# Patient Record
Sex: MALE | Race: WHITE | Employment: FULL TIME | URBAN - NONMETROPOLITAN AREA
[De-identification: names, ages, dates, MRNs, and addresses within clinical notes are randomized per-mention and may not be internally consistent; named-entity substitution may affect disease eponyms.]

---

## 2017-03-09 ENCOUNTER — OFFICE VISIT (OUTPATIENT)
Dept: FAMILY MEDICINE CLINIC | Age: 62
End: 2017-03-09

## 2017-03-09 VITALS
OXYGEN SATURATION: 98 % | SYSTOLIC BLOOD PRESSURE: 134 MMHG | HEART RATE: 80 BPM | BODY MASS INDEX: 41.58 KG/M2 | WEIGHT: 297 LBS | HEIGHT: 71 IN | DIASTOLIC BLOOD PRESSURE: 78 MMHG

## 2017-03-09 DIAGNOSIS — E78.5 HYPERLIPIDEMIA, UNSPECIFIED HYPERLIPIDEMIA TYPE: ICD-10-CM

## 2017-03-09 DIAGNOSIS — Z12.5 SCREENING PSA (PROSTATE SPECIFIC ANTIGEN): Primary | ICD-10-CM

## 2017-03-09 DIAGNOSIS — I10 ESSENTIAL HYPERTENSION: ICD-10-CM

## 2017-03-09 DIAGNOSIS — L30.9 DERMATITIS: ICD-10-CM

## 2017-03-09 PROCEDURE — 99213 OFFICE O/P EST LOW 20 MIN: CPT | Performed by: FAMILY MEDICINE

## 2017-03-09 RX ORDER — LOSARTAN POTASSIUM AND HYDROCHLOROTHIAZIDE 12.5; 5 MG/1; MG/1
1 TABLET ORAL DAILY
Qty: 90 TABLET | Refills: 3 | Status: SHIPPED | OUTPATIENT
Start: 2017-03-09 | End: 2018-04-24 | Stop reason: SDUPTHER

## 2017-03-09 RX ORDER — CLOTRIMAZOLE AND BETAMETHASONE DIPROPIONATE 10; .64 MG/G; MG/G
CREAM TOPICAL
Qty: 45 G | Refills: 2 | Status: SHIPPED | OUTPATIENT
Start: 2017-03-09 | End: 2018-01-30 | Stop reason: SDUPTHER

## 2017-04-10 LAB
ALBUMIN SERPL-MCNC: 4.4 G/DL
ALP BLD-CCNC: 68 U/L
ALT SERPL-CCNC: 15 U/L
AST SERPL-CCNC: 16 U/L
BASOPHILS ABSOLUTE: 0 /ΜL
BASOPHILS RELATIVE PERCENT: 0 %
BILIRUB SERPL-MCNC: 0.5 MG/DL (ref 0.1–1.4)
BUN BLDV-MCNC: 25 MG/DL
CALCIUM SERPL-MCNC: 8.9 MG/DL
CHLORIDE BLD-SCNC: 103 MMOL/L
CHOLESTEROL, TOTAL: 201 MG/DL
CHOLESTEROL/HDL RATIO: NORMAL
CO2: 23 MMOL/L
CREAT SERPL-MCNC: 1.57 MG/DL
EOSINOPHILS ABSOLUTE: 0.4 /ΜL
EOSINOPHILS RELATIVE PERCENT: 5 %
GFR CALCULATED: 47
GLUCOSE BLD-MCNC: 113 MG/DL
HCT VFR BLD CALC: 46.7 % (ref 41–53)
HDLC SERPL-MCNC: 46 MG/DL (ref 35–70)
HEMOGLOBIN: 15.3 G/DL (ref 13.5–17.5)
LDL CHOLESTEROL CALCULATED: 127 MG/DL (ref 0–160)
LYMPHOCYTES ABSOLUTE: 2.2 /ΜL
LYMPHOCYTES RELATIVE PERCENT: 28 %
MCH RBC QN AUTO: 32.3 PG
MCHC RBC AUTO-ENTMCNC: 13.7 G/DL
MCV RBC AUTO: 99 FL
MONOCYTES ABSOLUTE: 0.2 /ΜL
MONOCYTES RELATIVE PERCENT: 6 %
NEUTROPHILS ABSOLUTE: 4.7 /ΜL
NEUTROPHILS RELATIVE PERCENT: 61 %
PLATELET # BLD: 231 K/ΜL
PMV BLD AUTO: NORMAL FL
POTASSIUM SERPL-SCNC: 4.6 MMOL/L
PROSTATE SPECIFIC ANTIGEN: 1.2 NG/ML
RBC # BLD: 4.73 10^6/ΜL
SODIUM BLD-SCNC: 143 MMOL/L
TOTAL PROTEIN: 6.4
TRIGL SERPL-MCNC: 140 MG/DL
VLDLC SERPL CALC-MCNC: 28 MG/DL
WBC # BLD: 7.8 10^3/ML

## 2017-04-11 DIAGNOSIS — Z12.5 SCREENING PSA (PROSTATE SPECIFIC ANTIGEN): ICD-10-CM

## 2017-04-11 DIAGNOSIS — I10 ESSENTIAL HYPERTENSION: ICD-10-CM

## 2017-09-29 ENCOUNTER — OFFICE VISIT (OUTPATIENT)
Dept: FAMILY MEDICINE CLINIC | Age: 62
End: 2017-09-29

## 2017-09-29 VITALS
BODY MASS INDEX: 38.64 KG/M2 | OXYGEN SATURATION: 98 % | HEIGHT: 71 IN | SYSTOLIC BLOOD PRESSURE: 150 MMHG | TEMPERATURE: 97.4 F | WEIGHT: 276 LBS | DIASTOLIC BLOOD PRESSURE: 82 MMHG | HEART RATE: 68 BPM

## 2017-09-29 DIAGNOSIS — L03.115 CELLULITIS OF RIGHT LOWER EXTREMITY: ICD-10-CM

## 2017-09-29 DIAGNOSIS — R10.9 ABDOMINAL CRAMPING: Primary | ICD-10-CM

## 2017-09-29 PROCEDURE — 99213 OFFICE O/P EST LOW 20 MIN: CPT | Performed by: NURSE PRACTITIONER

## 2017-09-29 RX ORDER — DICYCLOMINE HYDROCHLORIDE 10 MG/1
10 CAPSULE ORAL 4 TIMES DAILY
Qty: 30 CAPSULE | Refills: 0 | Status: SHIPPED | OUTPATIENT
Start: 2017-09-29 | End: 2017-10-05 | Stop reason: ALTCHOICE

## 2017-09-29 RX ORDER — DOXYCYCLINE HYCLATE 100 MG
100 TABLET ORAL 2 TIMES DAILY
Qty: 20 TABLET | Refills: 0 | Status: SHIPPED | OUTPATIENT
Start: 2017-09-29 | End: 2017-10-05

## 2017-09-29 ASSESSMENT — ENCOUNTER SYMPTOMS
VOMITING: 0
FLATUS: 1
CRAMPS: 1
HEMATOCHEZIA: 0
DIARRHEA: 1
SORE THROAT: 0

## 2017-09-29 ASSESSMENT — CROHNS DISEASE ACTIVITY INDEX (CDAI): CDAI SCORE: 0

## 2017-10-05 ENCOUNTER — OFFICE VISIT (OUTPATIENT)
Dept: FAMILY MEDICINE CLINIC | Age: 62
End: 2017-10-05

## 2017-10-05 VITALS
WEIGHT: 281 LBS | OXYGEN SATURATION: 98 % | BODY MASS INDEX: 39.34 KG/M2 | HEIGHT: 71 IN | HEART RATE: 79 BPM | DIASTOLIC BLOOD PRESSURE: 70 MMHG | SYSTOLIC BLOOD PRESSURE: 130 MMHG

## 2017-10-05 DIAGNOSIS — L03.115 CELLULITIS OF RIGHT LOWER EXTREMITY: Primary | ICD-10-CM

## 2017-10-05 PROCEDURE — 99213 OFFICE O/P EST LOW 20 MIN: CPT | Performed by: FAMILY MEDICINE

## 2017-10-05 RX ORDER — MUPIROCIN CALCIUM 20 MG/G
CREAM TOPICAL
Qty: 30 G | Refills: 1 | Status: SHIPPED | OUTPATIENT
Start: 2017-10-05 | End: 2018-02-05 | Stop reason: SDUPTHER

## 2017-10-05 RX ORDER — CLINDAMYCIN HYDROCHLORIDE 300 MG/1
300 CAPSULE ORAL 3 TIMES DAILY
Qty: 30 CAPSULE | Refills: 0 | Status: SHIPPED | OUTPATIENT
Start: 2017-10-05 | End: 2017-10-15

## 2017-10-05 ASSESSMENT — PATIENT HEALTH QUESTIONNAIRE - PHQ9
2. FEELING DOWN, DEPRESSED OR HOPELESS: 0
1. LITTLE INTEREST OR PLEASURE IN DOING THINGS: 0
SUM OF ALL RESPONSES TO PHQ QUESTIONS 1-9: 0
SUM OF ALL RESPONSES TO PHQ9 QUESTIONS 1 & 2: 0

## 2017-10-05 NOTE — PROGRESS NOTES
Chief Complaint   Patient presents with    Follow-up     1 week f/u on on right leg. pt states it is still swollen and hurting. pt states he does not know what else to do. pt states his right knee is now hurting also. HPI:  Reza Wilks is a 57 y.o. male    1 week follow up  Right leg pain, swelling  Prescribed doxy  Not working  Hx MRSA cellulitis  Had to take clinda in past     Left knee has been acting up with arthritis    Has been a month without smoking    Wt Readings from Last 3 Encounters:   10/05/17 281 lb (127.5 kg)   09/29/17 276 lb (125.2 kg)   03/09/17 297 lb (134.7 kg)         Patient Active Problem List   Diagnosis    Hypertension    Tobacco abuse    Obesity    Hyperlipidemia    COPD [facility] obstructive [facility]) [facility] [address]    Cellulitis           Past Medical History:   Diagnosis Date    Cellulitis     COPD (chronic obstructive pulmonary disease) ([facility] [address]     Hyperlipidemia     Hypertension     Obesity     Pneumonia 2011    Retinal detachment     Tobacco abuse      Past Surgical History:   Procedure Laterality Date    EYE SURGERY  2016     Family History   Problem Relation Age of Onset    Heart Disease Mother     Cancer Father      LUNG    High Blood Pressure Father      Social History     Social History    Marital status:      Spouse name: N/A    Number of children: N/A    Years of education: N/A     Social History Main Topics    Smoking status: Current Every Day Smoker     Years: 40.00    Smokeless tobacco: Never Used    Alcohol use Yes      Comment: rare    Drug use: No    Sexual activity: Yes     Partners: Female     Other Topics Concern    None     Social History Narrative     Current Outpatient Prescriptions   Medication Sig Dispense Refill    clindamycin (CLEOCIN) 300 MG capsule Take 1 capsule by mouth 3 times daily for 10 days 30 capsule 0    mupirocin (BACTROBAN) 2 % cream Apply 3 times daily.  30 g 1    doxycycline hyclate (VIBRA-TABS) 100 MG

## 2017-10-05 NOTE — MR AVS SNAPSHOT
Learn more at: [email or web address].co.uk             Today's Medication Changes          These changes are accurate as of: 10/5/17  4:02 PM.  If you have any questions, ask your nurse or doctor. START taking these medications           clindamycin 300 MG capsule   Commonly known as:  CLEOCIN   Instructions: Take 1 capsule by mouth 3 times daily for 10 days   Quantity:  30 capsule   Refills:  0   Started by:  Hasmukh Murphy MD       mupirocin 2 % cream   Commonly known as:  BACTROBAN   Instructions:  Apply 3 times daily. Quantity:  30 g   Refills:  1   Started by:  Hasmukh Murphy MD         STOP taking these medications           dicyclomine 10 MG capsule   Commonly known as:  BENTYL   Stopped by:  Hasmukh Murphy MD       doxycycline hyclate 100 MG tablet   Commonly known as:  VIBRA-TABS   Stopped by:  Hasmukh Murphy MD            Where to Get Your Medications      These medications were sent to [address] [place] [address] 89 - [phone]  [address] [place] [address], [address]     Phone:  [phone]     clindamycin 300 MG capsule    mupirocin 2 % cream               Your Current Medications Are              clindamycin (CLEOCIN) 300 MG capsule Take 1 capsule by mouth 3 times daily for 10 days    mupirocin (BACTROBAN) 2 % cream Apply 3 times daily. Magnesium Gluconate (MAGNESIUM 27 PO) Take by mouth    losartan-hydrochlorothiazide (HYZAAR) 50-12.5 MG per tablet Take 1 tablet by mouth daily    clotrimazole-betamethasone (LOTRISONE) 1-0.05 % cream Apply topically 2 times daily. aspirin 81 MG tablet Take 81 mg by mouth daily.         Allergies              Ciprofloxacin     ANXIETY    Sulfa Antibiotics          Additional Information        Basic Information     Date Of Birth Sex Race Ethnicity Preferred Language    1955 Male White Non-/Non  English      Problem List as of 10/5/2017  Date Reviewed: 9/29/2017 Cellulitis    Hypertension    Tobacco abuse    Obesity    Hyperlipidemia    COPD (chronic obstructive pulmonary disease) ([facility] [address]      Immunizations as of 10/5/2017     Name Date    Influenza Virus Vaccine 9/24/2015    Influenza Whole 11/1/2011    Pneumococcal Polysaccharide (Ywrbjbmkg18) 9/24/2015    Tdap (Boostrix, Adacel) 3/13/2015      Preventive Care        Date Due    Diabetes Screening 12/11/1994    Zoster Vaccine 12/11/2014    Yearly Flu Vaccine (1) 9/1/2017    Colonoscopy 3/9/2018 (Originally 12/11/2004)    Cholesterol Screening 4/10/2022    Tetanus Combination Vaccine (2 - Td) 3/13/2025            MyChart Signup           [employer] allows you to send messages to your doctor, view your test results, renew your prescriptions, schedule appointments, view visit notes, and more. How Do I Sign Up? 1. In your Internet browser, go to [email or web address]  2. Click on the Sign Up Now link in the Sign In box. You will see the New Member Sign Up page. 3. Enter your [employer]ifty Access Code exactly as it appears below. You will not need to use this code after youve completed the sign-up process. If you do not sign up before the expiration date, you must request a new code. [employer]ty Access Code: [email or web address]  Expires: 11/28/2017  9:34 AM    4. Enter your Social Security Number ([id number]) and Date of Birth (mm/dd/yyyy) as indicated and click Submit. You will be taken to the next sign-up page. 5. Create a Drifty ID. This will be your Drifty login ID and cannot be changed, so think of one that is secure and easy to remember. 6. Create a Drifty password. You can change your password at any time. 7. Enter your Password Reset Question and Answer. This can be used at a later time if you forget your password. 8. Enter your e-mail address. You will receive e-mail notification when new information is available in [address] 9. Click Sign Up. You can now view your medical record.

## 2017-10-10 ENCOUNTER — TELEPHONE (OUTPATIENT)
Dept: FAMILY MEDICINE CLINIC | Age: 62
End: 2017-10-10

## 2017-10-10 NOTE — TELEPHONE ENCOUNTER
Pt is having issues taking the clindamycin. Causing upset stomach. He has taken with milk/food and he has the same issues  His legs are clearing up.

## 2017-10-11 ENCOUNTER — TELEPHONE (OUTPATIENT)
Dept: FAMILY MEDICINE CLINIC | Age: 62
End: 2017-10-11

## 2017-10-11 RX ORDER — CEPHALEXIN 500 MG/1
500 CAPSULE ORAL 4 TIMES DAILY
Qty: 40 CAPSULE | Refills: 0 | Status: SHIPPED | OUTPATIENT
Start: 2017-10-11 | End: 2017-12-13 | Stop reason: ALTCHOICE

## 2017-10-12 ENCOUNTER — TELEPHONE (OUTPATIENT)
Dept: FAMILY MEDICINE CLINIC | Age: 62
End: 2017-10-12

## 2017-10-12 NOTE — TELEPHONE ENCOUNTER
Pt is asking for a work excuse letter that he can return to work tomorrow. pt has been out of work Monday Tuesday, Wednesday and today. Please advise. [phone].

## 2017-10-28 ENCOUNTER — OFFICE VISIT (OUTPATIENT)
Dept: FAMILY MEDICINE CLINIC | Age: 62
End: 2017-10-28

## 2017-10-28 VITALS
HEART RATE: 72 BPM | WEIGHT: 272.6 LBS | HEIGHT: 71 IN | DIASTOLIC BLOOD PRESSURE: 74 MMHG | TEMPERATURE: 97.8 F | OXYGEN SATURATION: 98 % | SYSTOLIC BLOOD PRESSURE: 110 MMHG | BODY MASS INDEX: 38.16 KG/M2

## 2017-10-28 DIAGNOSIS — R19.7 DIARRHEA, UNSPECIFIED TYPE: Primary | ICD-10-CM

## 2017-10-28 DIAGNOSIS — B34.9 VIRAL ILLNESS: ICD-10-CM

## 2017-10-28 DIAGNOSIS — M79.10 MYALGIA: ICD-10-CM

## 2017-10-28 PROCEDURE — 99213 OFFICE O/P EST LOW 20 MIN: CPT | Performed by: FAMILY MEDICINE

## 2017-10-28 RX ORDER — TRAMADOL HYDROCHLORIDE 50 MG/1
50 TABLET ORAL EVERY 6 HOURS PRN
Qty: 15 TABLET | Refills: 0 | Status: SHIPPED | OUTPATIENT
Start: 2017-10-28 | End: 2017-11-07

## 2017-10-28 NOTE — LETTER
Marge CARBAJAL PCP  [address] [facility], [address] [place] [address], [address]  [place]  Phone: [phone]  Fax: Rosa Richard MD        October 28, 2017     Patient: Jerson Simmons   YOB: 1955   Date of Visit: 10/28/2017       To Whom it May Concern:    Aster Wilson was seen in my clinic on 10/28/2017. He may return to work on 11/1/17. Please excuse from 10/24/17 through 10/31/17. If you have any questions or concerns, please don't hesitate to call.     Sincerely,         Ashlyn Lr MD

## 2017-10-28 NOTE — PROGRESS NOTES
Chief Complaint   Patient presents with    URI     x tuesday, headache, cough, congestion, body aches, unable to keep anything down, no fever       HPI:   Maxim Keller is a 57 y.o.male that complains of symptoms as detailed above. Symptoms began about a week ago    Started like a cold    Works in nursing home. Alternating hot/cold feeling    Headache, diarrhea    Has had bouts of bronchitis in the past.    achy. Nasal congestion, yellow mucous  Cough is minimally productive. General malaise. Patient denies SOB    Has mild COPD hx  Delsym and tylenol some help. reports that he has been smoking. He has smoked for the past 40.00 years. He has never used smokeless tobacco.    Patient's medications, allergies, past medical, surgical, social and family histories were reviewed and updated as appropriate. Physical Exam:  /74 (Site: Left Arm)   Pulse 72   Temp 97.8 °F (36.6 °C)   Ht 5' 11\" (1.803 m)   Wt 272 lb 9.6 oz (123.7 kg)   SpO2 98%   BMI 38.02 kg/m²   General appearance - tired appearing, and not in acute respiratory distress  Mental Status - alert, oriented to person, place, and time  Eyes - pupils equal and reactive, extraocular eye movements intact   Ears - bilateral TM's and external ear canals normal   Nose - mild congestion  Sinuses - no sinus tenderness  Throat - mucous membranes moist, pharynx normal without lesions   Neck - mild to moderate anterior lymphadenopathy    Chest: rhonchi noted diffusely, clear with cough. Heart - normal rate, regular rhythm, normal S1, S2, no murmurs, rubs, clicks or gallops       A/P  1. Diarrhea, unspecified type  Clostridium Difficile Toxin   2. Myalgia  traMADol (ULTRAM) 50 MG tablet   3. Viral illness       Meds as ordered. Check for c-diff  Tramadol for myalgias      OTC anti-tussives, steam inhalation, rest/fluids  Call or return to clinic prn if these symptoms worsen or fail to improve as anticipated.           Ignacio Felt,

## 2017-10-30 DIAGNOSIS — R19.7 DIARRHEA, UNSPECIFIED TYPE: ICD-10-CM

## 2017-10-31 LAB — CLOSTRIDIUM DIFFICILE DNA AMPLIFICATION: NORMAL

## 2017-11-03 ENCOUNTER — TELEPHONE (OUTPATIENT)
Dept: FAMILY MEDICINE CLINIC | Age: 62
End: 2017-11-03

## 2017-11-03 RX ORDER — CEFDINIR 300 MG/1
300 CAPSULE ORAL 2 TIMES DAILY
Qty: 20 CAPSULE | Refills: 0 | Status: SHIPPED | OUTPATIENT
Start: 2017-11-03 | End: 2017-11-13

## 2017-11-06 ENCOUNTER — TELEPHONE (OUTPATIENT)
Dept: FAMILY MEDICINE CLINIC | Age: 62
End: 2017-11-06

## 2017-11-06 NOTE — TELEPHONE ENCOUNTER
Pt calling stating that our first letter to return to work was incorrect. It needs to state that he can return to work today without any restrictions. Pt can't work until they get the letter. Can you please do this and fax to [phone] attn: Rama Thanks!

## 2017-12-13 ENCOUNTER — OFFICE VISIT (OUTPATIENT)
Dept: FAMILY MEDICINE CLINIC | Age: 62
End: 2017-12-13

## 2017-12-13 VITALS
DIASTOLIC BLOOD PRESSURE: 86 MMHG | OXYGEN SATURATION: 97 % | HEART RATE: 76 BPM | TEMPERATURE: 97.5 F | WEIGHT: 295.2 LBS | HEIGHT: 71 IN | BODY MASS INDEX: 41.33 KG/M2 | SYSTOLIC BLOOD PRESSURE: 118 MMHG

## 2017-12-13 DIAGNOSIS — R30.0 DYSURIA: Primary | ICD-10-CM

## 2017-12-13 DIAGNOSIS — K57.92 DIVERTICULITIS OF INTESTINE WITHOUT PERFORATION OR ABSCESS WITHOUT BLEEDING, UNSPECIFIED PART OF INTESTINAL TRACT: ICD-10-CM

## 2017-12-13 DIAGNOSIS — R30.0 DYSURIA: ICD-10-CM

## 2017-12-13 LAB
BILIRUBIN, POC: NORMAL
BLOOD URINE, POC: NORMAL
CLARITY, POC: CLEAR
COLOR, POC: YELLOW
GLUCOSE URINE, POC: NORMAL
KETONES, POC: NORMAL
LEUKOCYTE EST, POC: NORMAL
NITRITE, POC: NORMAL
PH, POC: 6
PROTEIN, POC: NORMAL
SPECIFIC GRAVITY, POC: 1.03
UROBILINOGEN, POC: 0.2

## 2017-12-13 PROCEDURE — 99213 OFFICE O/P EST LOW 20 MIN: CPT | Performed by: NURSE PRACTITIONER

## 2017-12-13 PROCEDURE — 81003 URINALYSIS AUTO W/O SCOPE: CPT | Performed by: NURSE PRACTITIONER

## 2017-12-13 RX ORDER — AMOXICILLIN AND CLAVULANATE POTASSIUM 875; 125 MG/1; MG/1
1 TABLET, FILM COATED ORAL 2 TIMES DAILY
Qty: 20 TABLET | Refills: 0 | Status: SHIPPED | OUTPATIENT
Start: 2017-12-13 | End: 2018-01-16 | Stop reason: SDUPTHER

## 2017-12-13 ASSESSMENT — ENCOUNTER SYMPTOMS
DIARRHEA: 1
BACK PAIN: 1
ABDOMINAL PAIN: 1

## 2017-12-13 NOTE — PROGRESS NOTES
Subjective  Chief Complaint   Patient presents with    Fever     pt. states he be having burning when urinating since yesterday an fever       Urinary Tract Infection    This is a new problem. The current episode started yesterday. The problem occurs every urination. The problem has been unchanged. The quality of the pain is described as burning. Pain scale: felt feverish. Associated symptoms include flank pain. Pertinent negatives include no frequency, hematuria, hesitancy or urgency. He has tried nothing for the symptoms.          Patient Active Problem List    Diagnosis Date Noted    Cellulitis     Hypertension     Tobacco abuse     Obesity     Hyperlipidemia     COPD (chronic obstructive pulmonary disease) ([facility] [address]      Past Medical History:   Diagnosis Date    Cellulitis     COPD (chronic obstructive pulmonary disease) ([facility] [address]     Hyperlipidemia     Hypertension     Obesity     Pneumonia 2011    Retinal detachment     Tobacco abuse      Past Surgical History:   Procedure Laterality Date    EYE SURGERY  2016     Family History   Problem Relation Age of Onset    Heart Disease Mother     Cancer Father      LUNG    High Blood Pressure Father      Social History     Social History    Marital status:      Spouse name: N/A    Number of children: N/A    Years of education: N/A     Social History Main Topics    Smoking status: Former Smoker     Years: 40.00    Smokeless tobacco: Never Used    Alcohol use Yes      Comment: rare    Drug use: No    Sexual activity: Yes     Partners: Female     Other Topics Concern    None     Social History Narrative    None     Current Outpatient Prescriptions on File Prior to Visit   Medication Sig Dispense Refill    Magnesium Gluconate (MAGNESIUM 27 PO) Take by mouth      losartan-hydrochlorothiazide (HYZAAR) 50-12.5 MG per tablet Take 1 tablet by mouth daily 90 tablet 3    clotrimazole-betamethasone (LOTRISONE) 1-0.05 % cream Apply topically 2 times daily. 45 g 2    aspirin 81 MG tablet Take 81 mg by mouth daily. No current facility-administered medications on file prior to visit. Allergies   Allergen Reactions    Ciprofloxacin      ANXIETY    Clindamycin/Lincomycin     Sulfa Antibiotics        Review of Systems   Gastrointestinal: Positive for abdominal pain (LLQ) and diarrhea. Genitourinary: Positive for flank pain. Negative for frequency, hematuria, hesitancy and urgency. Musculoskeletal: Positive for back pain. Objective  Vitals:    12/13/17 0848 12/13/17 0855 12/13/17 0909   BP: (!) 140/100 (!) 140/100 118/86   Site: Right Arm Right Arm    Position: Sitting Sitting    Cuff Size: Large Adult Large Adult    Pulse: 76     Temp: 97.5 °F (36.4 °C)     TempSrc: Tympanic     SpO2: 97%     Weight: 295 lb 3.2 oz (133.9 kg)     Height: 5' 11\" (1.803 m)       Physical Exam   Constitutional: He is oriented to person, place, and time. He appears well-developed and well-nourished. Eyes: Conjunctivae are normal. Pupils are equal, round, and reactive to light. Cardiovascular: Normal rate, regular rhythm, normal heart sounds and intact distal pulses. Pulmonary/Chest: Effort normal and breath sounds normal.   Abdominal: Soft. There is tenderness (LLQ). Neurological: He is alert and oriented to person, place, and time. Psychiatric: He has a normal mood and affect. His behavior is normal. Judgment and thought content normal.         Assessment & Plan    1. Dysuria  Urine Culture    amoxicillin-clavulanate (AUGMENTIN) 875-125 MG per tablet   2. Diverticulitis of intestine without perforation or abscess without bleeding, unspecified part of intestinal tract  amoxicillin-clavulanate (AUGMENTIN) 875-125 MG per tablet       Orders Placed This Encounter   Procedures    Urine Culture     Standing Status:   Future     Standing Expiration Date:   12/13/2018     Order Specific Question:   Specify (ex-cath, midstream, cysto, etc)?      Answer: midstream       Orders Placed This Encounter   Medications    amoxicillin-clavulanate (AUGMENTIN) 875-125 MG per tablet     Sig: Take 1 tablet by mouth 2 times daily for 10 days     Dispense:  20 tablet     Refill:  0       Side effects, adverse effects of the medication prescribed today, as well as treatment plan/ rationale and result expectations have been discussed with the patient who expresses understanding and desires to proceed. Close follow up to evaluate treatment results and for coordination of care. I have reviewed the patient's medical history in detail and updated the computerized patient record. As always, patient is advised that if symptoms worsen in any way they will proceed to the nearest emergency room. We will fu prn. He is to let me know if symptoms don't improve over the next 48 hrs. Will consider CT at that time.      Nando Quintana NP

## 2017-12-13 NOTE — LETTER
Jasmin CARBAJAL PCP  [address] [facility], [address] [place] [address], [address]  [place]  Phone: [phone]  Fax: [phone]    Nabil Dykes NP        December 13, 2017     Patient: Nehal Julien   YOB: 1955   Date of Visit: 12/13/2017       To Whom it May Concern:    Matias Valdez was seen in my clinic on 12/13/2017. He may return to work on 12/15/2017 with no restrictions. Please excuse 12/11/2017 thru 12/14/2017. If you have any questions or concerns, please don't hesitate to call.     Sincerely,         Nabil Dykes NP

## 2017-12-14 LAB — URINE CULTURE, ROUTINE: NORMAL

## 2017-12-18 ENCOUNTER — OFFICE VISIT (OUTPATIENT)
Dept: FAMILY MEDICINE CLINIC | Age: 62
End: 2017-12-18

## 2017-12-18 VITALS
DIASTOLIC BLOOD PRESSURE: 70 MMHG | BODY MASS INDEX: 41.02 KG/M2 | TEMPERATURE: 96.6 F | WEIGHT: 293 LBS | HEART RATE: 78 BPM | OXYGEN SATURATION: 97 % | SYSTOLIC BLOOD PRESSURE: 132 MMHG | HEIGHT: 71 IN

## 2017-12-18 DIAGNOSIS — J40 BRONCHITIS: Primary | ICD-10-CM

## 2017-12-18 PROCEDURE — 99213 OFFICE O/P EST LOW 20 MIN: CPT | Performed by: NURSE PRACTITIONER

## 2017-12-18 RX ORDER — BENZONATATE 100 MG/1
100 CAPSULE ORAL 3 TIMES DAILY PRN
Qty: 21 CAPSULE | Refills: 0 | Status: SHIPPED | OUTPATIENT
Start: 2017-12-18 | End: 2017-12-25

## 2017-12-18 RX ORDER — METHYLPREDNISOLONE 4 MG/1
TABLET ORAL
Qty: 21 TABLET | Refills: 0 | Status: SHIPPED | OUTPATIENT
Start: 2017-12-18 | End: 2017-12-24

## 2017-12-18 ASSESSMENT — ENCOUNTER SYMPTOMS
COUGH: 1
WHEEZING: 0
SINUS PAIN: 1
SHORTNESS OF BREATH: 1
SORE THROAT: 1
RHINORRHEA: 1
NAUSEA: 0
VOMITING: 0
DIARRHEA: 0

## 2017-12-18 NOTE — PROGRESS NOTES
Subjective  Chief Complaint   Patient presents with    Urinary Tract Infection     pt states that the abdominal and urinary issue is improving.  URI     X 3 days       URI    This is a new problem. The current episode started in the past 7 days (SAT). The problem has been gradually worsening. There has been no fever. Associated symptoms include congestion, coughing (productive, green in color), rhinorrhea, sinus pain, sneezing and a sore throat. Pertinent negatives include no diarrhea, dysuria, ear pain, nausea, neck pain, plugged ear sensation, vomiting or wheezing. He has tried nothing for the symptoms. Pt notes that urinary symptoms have improved as well as bowels.      Patient Active Problem List    Diagnosis Date Noted    Cellulitis     Hypertension     Tobacco abuse     Obesity     Hyperlipidemia     COPD (chronic obstructive pulmonary disease) ([facility] [address]      Past Medical History:   Diagnosis Date    Cellulitis     COPD (chronic obstructive pulmonary disease) ([facility] [address]     Hyperlipidemia     Hypertension     Obesity     Pneumonia 2011    Retinal detachment     Tobacco abuse      Past Surgical History:   Procedure Laterality Date    EYE SURGERY  2016     Family History   Problem Relation Age of Onset    Heart Disease Mother     Cancer Father      LUNG    High Blood Pressure Father      Social History     Social History    Marital status:      Spouse name: N/A    Number of children: N/A    Years of education: N/A     Social History Main Topics    Smoking status: Former Smoker     Years: 40.00    Smokeless tobacco: Never Used    Alcohol use Yes      Comment: rare    Drug use: No    Sexual activity: Yes     Partners: Female     Other Topics Concern    None     Social History Narrative    None     Current Outpatient Prescriptions on File Prior to Visit   Medication Sig Dispense Refill    amoxicillin-clavulanate (AUGMENTIN) 875-125 MG per tablet Take 1 tablet by mouth 2 times daily for 10 days 20 tablet 0    Magnesium Gluconate (MAGNESIUM 27 PO) Take by mouth      losartan-hydrochlorothiazide (HYZAAR) 50-12.5 MG per tablet Take 1 tablet by mouth daily 90 tablet 3    clotrimazole-betamethasone (LOTRISONE) 1-0.05 % cream Apply topically 2 times daily. 45 g 2    aspirin 81 MG tablet Take 81 mg by mouth daily. No current facility-administered medications on file prior to visit. Allergies   Allergen Reactions    Ciprofloxacin      ANXIETY    Clindamycin/Lincomycin     Sulfa Antibiotics        Review of Systems   Constitutional: Positive for fatigue. Negative for fever. HENT: Positive for congestion, rhinorrhea, sinus pain, sneezing and sore throat. Negative for ear pain. Respiratory: Positive for cough (productive, green in color) and shortness of breath. Negative for wheezing. Gastrointestinal: Negative for diarrhea, nausea and vomiting. Genitourinary: Negative for dysuria. Musculoskeletal: Negative for neck pain. Objective  Vitals:    12/18/17 0957   BP: 132/70   Site: Left Arm   Position: Sitting   Cuff Size: Large Adult   Pulse: 78   Temp: 96.6 °F (35.9 °C)   TempSrc: Tympanic   SpO2: 97%   Weight: 293 lb (132.9 kg)   Height: 5' 11\" (1.803 m)     Physical Exam   Constitutional: He is oriented to person, place, and time. He appears well-developed and well-nourished. Eyes: Conjunctivae are normal. Pupils are equal, round, and reactive to light. Neck: Neck supple. No thyromegaly present. Cardiovascular: Normal rate, regular rhythm, normal heart sounds and intact distal pulses. Pulmonary/Chest: Effort normal. He has rhonchi. Lymphadenopathy:     He has no cervical adenopathy. Neurological: He is alert and oriented to person, place, and time. Psychiatric: He has a normal mood and affect. His behavior is normal. Judgment and thought content normal.     Assessment & Plan    1.  Bronchitis  methylPREDNISolone (MEDROL DOSEPACK) 4 MG tablet

## 2018-01-16 ENCOUNTER — TELEPHONE (OUTPATIENT)
Dept: FAMILY MEDICINE CLINIC | Age: 63
End: 2018-01-16

## 2018-01-16 DIAGNOSIS — K57.92 DIVERTICULITIS OF INTESTINE WITHOUT PERFORATION OR ABSCESS WITHOUT BLEEDING, UNSPECIFIED PART OF INTESTINAL TRACT: ICD-10-CM

## 2018-01-16 DIAGNOSIS — R30.0 DYSURIA: ICD-10-CM

## 2018-01-16 RX ORDER — AMOXICILLIN AND CLAVULANATE POTASSIUM 875; 125 MG/1; MG/1
1 TABLET, FILM COATED ORAL 2 TIMES DAILY
Qty: 20 TABLET | Refills: 0 | Status: SHIPPED | OUTPATIENT
Start: 2018-01-16 | End: 2018-01-26

## 2018-01-16 NOTE — TELEPHONE ENCOUNTER
Pt's Diverticulitis is coming back and pt was wondering if you could send something in? Please advise.

## 2018-01-30 ENCOUNTER — OFFICE VISIT (OUTPATIENT)
Dept: FAMILY MEDICINE CLINIC | Age: 63
End: 2018-01-30
Payer: COMMERCIAL

## 2018-01-30 VITALS
HEIGHT: 71 IN | TEMPERATURE: 98 F | WEIGHT: 299 LBS | HEART RATE: 73 BPM | OXYGEN SATURATION: 98 % | SYSTOLIC BLOOD PRESSURE: 128 MMHG | BODY MASS INDEX: 41.86 KG/M2 | DIASTOLIC BLOOD PRESSURE: 78 MMHG

## 2018-01-30 DIAGNOSIS — K57.32 DIVERTICULITIS OF LARGE INTESTINE WITHOUT PERFORATION OR ABSCESS WITHOUT BLEEDING: Primary | ICD-10-CM

## 2018-01-30 DIAGNOSIS — L30.9 DERMATITIS: ICD-10-CM

## 2018-01-30 PROCEDURE — 99213 OFFICE O/P EST LOW 20 MIN: CPT | Performed by: FAMILY MEDICINE

## 2018-01-30 RX ORDER — AMOXICILLIN AND CLAVULANATE POTASSIUM 875; 125 MG/1; MG/1
1 TABLET, FILM COATED ORAL 2 TIMES DAILY
Qty: 20 TABLET | Refills: 0 | Status: SHIPPED | OUTPATIENT
Start: 2018-01-30 | End: 2018-02-09

## 2018-01-30 RX ORDER — CLOTRIMAZOLE AND BETAMETHASONE DIPROPIONATE 10; .64 MG/G; MG/G
CREAM TOPICAL
Qty: 45 G | Refills: 2 | Status: SHIPPED | OUTPATIENT
Start: 2018-01-30

## 2018-01-30 NOTE — PROGRESS NOTES
Chief Complaint   Patient presents with    Diverticulitis     x last week, thrusday vomiting, abdomen tender        HPI: 60 y.o. male who presents mainly for diverticulitis     Two weeks ago had diverticulitis attack  Started Augmentin 1/16 but had an episode of nausea and vomiting last thursday  Vomited bile 3 times and abdomen is tender    Has not vomited since then but abdomen is     Tolerating PO, diet is back to normal     BMs back to normal    Denies f/c, n/v/d      Vitals:    01/30/18 1528   BP: 128/78   Site: Left Arm   Pulse: 73   Temp: 98 °F (36.7 °C)   SpO2: 98%   Weight: 299 lb (135.6 kg)   Height: 5' 11\" (1.803 m)       Pertinent Physical exam findings:  Cardiac: RRR  Lungs: CTAB  Abdomen: Bowel sounds positive, diffuse pain upon palpation lower quadrants    CANDACE RECKO       A/P  1. Diverticulitis of large intestine without perforation or abscess without bleeding  CT ABDOMEN PELVIS W IV CONTRAST Additional Contrast? Oral   2.  Dermatitis  clotrimazole-betamethasone (LOTRISONE) 1-0.05 % cream     Fiber and probiotic  Will check CT abdomen since no recent imaging, recent diverticulitis  Back up rx for augmentin    Refill of lotrisone for dermatitis    Samples of Melodie Tatum MD

## 2018-02-05 DIAGNOSIS — L03.115 CELLULITIS OF RIGHT LOWER EXTREMITY: ICD-10-CM

## 2018-02-05 RX ORDER — MUPIROCIN CALCIUM 20 MG/G
CREAM TOPICAL
Qty: 30 G | Refills: 1 | Status: SHIPPED | OUTPATIENT
Start: 2018-02-05 | End: 2018-03-07

## 2018-02-10 ENCOUNTER — HOSPITAL ENCOUNTER (OUTPATIENT)
Dept: CT IMAGING | Age: 63
Discharge: HOME | End: 2018-02-12
Payer: COMMERCIAL

## 2018-02-10 VITALS — SYSTOLIC BLOOD PRESSURE: 137 MMHG | DIASTOLIC BLOOD PRESSURE: 88 MMHG | HEART RATE: 83 BPM | RESPIRATION RATE: 20 BRPM

## 2018-02-10 DIAGNOSIS — K57.32 DIVERTICULITIS OF LARGE INTESTINE WITHOUT PERFORATION OR ABSCESS WITHOUT BLEEDING: ICD-10-CM

## 2018-02-10 LAB
GFR AFRICAN AMERICAN: 53
GFR NON-AFRICAN AMERICAN: 44
PERFORMED ON: ABNORMAL
POC CREATININE: 1.6 MG/DL (ref 0.8–1.3)
POC SAMPLE TYPE: ABNORMAL

## 2018-02-10 PROCEDURE — 2500000003 HC RX 250 WO HCPCS: Performed by: FAMILY MEDICINE

## 2018-02-10 PROCEDURE — 6360000004 HC RX CONTRAST MEDICATION: Performed by: FAMILY MEDICINE

## 2018-02-10 PROCEDURE — 2580000003 HC RX 258: Performed by: FAMILY MEDICINE

## 2018-02-10 PROCEDURE — 74177 CT ABD & PELVIS W/CONTRAST: CPT

## 2018-02-10 RX ORDER — SODIUM CHLORIDE 0.9 % (FLUSH) 0.9 %
10 SYRINGE (ML) INJECTION PRN
Status: DISCONTINUED | OUTPATIENT
Start: 2018-02-10 | End: 2018-02-13 | Stop reason: HOSPADM

## 2018-02-10 RX ADMIN — BARIUM SULFATE 450 ML: 21 SUSPENSION ORAL at 09:06

## 2018-02-10 RX ADMIN — SODIUM CHLORIDE, PRESERVATIVE FREE 10 ML: 5 INJECTION INTRAVENOUS at 09:07

## 2018-02-10 RX ADMIN — IOPAMIDOL 100 ML: 755 INJECTION, SOLUTION INTRAVENOUS at 09:06

## 2018-02-13 ENCOUNTER — TELEPHONE (OUTPATIENT)
Dept: FAMILY MEDICINE CLINIC | Age: 63
End: 2018-02-13

## 2018-02-13 DIAGNOSIS — R93.3 ABNORMAL CT SCAN, COLON: Primary | ICD-10-CM

## 2018-02-13 NOTE — TELEPHONE ENCOUNTER
1. Abnormal CT scan, colon  [facility], [facility]

## 2018-02-15 ENCOUNTER — OFFICE VISIT (OUTPATIENT)
Dept: GASTROENTEROLOGY | Age: 63
End: 2018-02-15
Payer: COMMERCIAL

## 2018-02-15 VITALS
RESPIRATION RATE: 12 BRPM | HEIGHT: 71 IN | DIASTOLIC BLOOD PRESSURE: 83 MMHG | WEIGHT: 287 LBS | BODY MASS INDEX: 40.18 KG/M2 | SYSTOLIC BLOOD PRESSURE: 109 MMHG | HEART RATE: 87 BPM

## 2018-02-15 DIAGNOSIS — R10.30 LOWER ABDOMINAL PAIN: Primary | ICD-10-CM

## 2018-02-15 DIAGNOSIS — R93.3 ABNORMAL CT SCAN, GASTROINTESTINAL TRACT: ICD-10-CM

## 2018-02-15 PROCEDURE — 99203 OFFICE O/P NEW LOW 30 MIN: CPT | Performed by: INTERNAL MEDICINE

## 2018-02-21 ENCOUNTER — HOSPITAL ENCOUNTER (OUTPATIENT)
Age: 63
Setting detail: OUTPATIENT SURGERY
Discharge: HOME | End: 2018-02-21
Attending: INTERNAL MEDICINE | Admitting: INTERNAL MEDICINE
Payer: COMMERCIAL

## 2018-02-21 ENCOUNTER — ANESTHESIA EVENT (OUTPATIENT)
Dept: ENDOSCOPY | Age: 63
End: 2018-02-21
Payer: COMMERCIAL

## 2018-02-21 ENCOUNTER — ANESTHESIA (OUTPATIENT)
Dept: ENDOSCOPY | Age: 63
End: 2018-02-21
Payer: COMMERCIAL

## 2018-02-21 VITALS
SYSTOLIC BLOOD PRESSURE: 121 MMHG | WEIGHT: 275 LBS | TEMPERATURE: 98.7 F | RESPIRATION RATE: 18 BRPM | HEIGHT: 70 IN | HEART RATE: 87 BPM | BODY MASS INDEX: 39.37 KG/M2 | OXYGEN SATURATION: 97 % | DIASTOLIC BLOOD PRESSURE: 78 MMHG

## 2018-02-21 VITALS
SYSTOLIC BLOOD PRESSURE: 140 MMHG | RESPIRATION RATE: 13 BRPM | DIASTOLIC BLOOD PRESSURE: 78 MMHG | OXYGEN SATURATION: 99 %

## 2018-02-21 PROCEDURE — 88305 TISSUE EXAM BY PATHOLOGIST: CPT

## 2018-02-21 PROCEDURE — 45388 COLONOSCOPY W/ABLATION: CPT | Performed by: INTERNAL MEDICINE

## 2018-02-21 PROCEDURE — 3609027000 HC COLONOSCOPY: Performed by: INTERNAL MEDICINE

## 2018-02-21 PROCEDURE — 3700000000 HC ANESTHESIA ATTENDED CARE: Performed by: INTERNAL MEDICINE

## 2018-02-21 PROCEDURE — 7100000010 HC PHASE II RECOVERY - FIRST 15 MIN: Performed by: INTERNAL MEDICINE

## 2018-02-21 PROCEDURE — 45381 COLONOSCOPY SUBMUCOUS NJX: CPT | Performed by: INTERNAL MEDICINE

## 2018-02-21 PROCEDURE — 3700000001 HC ADD 15 MINUTES (ANESTHESIA): Performed by: INTERNAL MEDICINE

## 2018-02-21 PROCEDURE — 45385 COLONOSCOPY W/LESION REMOVAL: CPT | Performed by: INTERNAL MEDICINE

## 2018-02-21 PROCEDURE — 2500000003 HC RX 250 WO HCPCS: Performed by: NURSE ANESTHETIST, CERTIFIED REGISTERED

## 2018-02-21 RX ORDER — SODIUM CHLORIDE 9 MG/ML
INJECTION, SOLUTION INTRAVENOUS CONTINUOUS
Status: DISCONTINUED | OUTPATIENT
Start: 2018-02-21 | End: 2018-02-21 | Stop reason: HOSPADM

## 2018-02-21 RX ORDER — GLYCOPYRROLATE 1 MG/5 ML
SYRINGE (ML) INTRAVENOUS PRN
Status: DISCONTINUED | OUTPATIENT
Start: 2018-02-21 | End: 2018-02-21 | Stop reason: SDUPTHER

## 2018-02-21 RX ADMIN — Medication 0.2 MG: at 11:38

## 2018-02-21 RX ADMIN — Medication 0.2 MG: at 11:43

## 2018-02-21 NOTE — ANESTHESIA PRE PROCEDURE
Department of Anesthesiology  Preprocedure Note       Name:  Dorita Vigil   Age:  60 y.o.  :  1955                                          MRN:  [id number]         Date:  2018      Surgeon: Manuel Gonzalez):  Yohannes Roman MD    Procedure: Procedure(s):  COLONOSCOPY    Medications prior to admission:   Prior to Admission medications    Medication Sig Start Date End Date Taking? Authorizing Provider   mupirocin (BACTROBAN) 2 % cream Apply 3 times daily. 2/5/18 3/7/18  Amelia Shaver MD   clotrimazole-betamethasone (LOTRISONE) 1-0.05 % cream Apply topically 2 times daily. 18   Amelia Shaver MD   Magnesium Gluconate (MAGNESIUM 27 PO) Take by mouth    Historical Provider, MD   losartan-hydrochlorothiazide [facility]) 50-12.5 MG per tablet Take 1 tablet by mouth daily 3/9/17   Amelia Shaver MD   aspirin 81 MG tablet Take 81 mg by mouth daily. Historical Provider, MD       Current medications:    Current Facility-Administered Medications   Medication Dose Route Frequency Provider Last Rate Last Dose    0.9 % sodium chloride infusion   Intravenous Continuous Yohannes Roman MD           Allergies:     Allergies   Allergen Reactions    Ciprofloxacin      ANXIETY    Clindamycin/Lincomycin     Sulfa Antibiotics        Problem List:    Patient Active Problem List   Diagnosis Code    Hypertension I10    Tobacco abuse Z72.0    Obesity E66.9    Hyperlipidemia E78.5    COPD (chronic obstructive pulmonary disease) ([facility] [address] J44.9    Cellulitis L03.90       Past Medical History:        Diagnosis Date    Cellulitis     COPD (chronic obstructive pulmonary disease) ([facility] [address]     Hyperlipidemia     Hypertension     Obesity     Pneumonia 2011    Retinal detachment     Tobacco abuse        Past Surgical History:        Procedure Laterality Date    EYE SURGERY  2016    EYE SURGERY         Social History:    Social History   Substance Use Topics    Smoking status: Former Smoker     Years: 40.00   

## 2018-03-15 ENCOUNTER — OFFICE VISIT (OUTPATIENT)
Dept: FAMILY MEDICINE CLINIC | Age: 63
End: 2018-03-15
Payer: COMMERCIAL

## 2018-03-15 VITALS
DIASTOLIC BLOOD PRESSURE: 78 MMHG | WEIGHT: 286.2 LBS | HEART RATE: 79 BPM | OXYGEN SATURATION: 98 % | BODY MASS INDEX: 40.07 KG/M2 | SYSTOLIC BLOOD PRESSURE: 136 MMHG | HEIGHT: 71 IN

## 2018-03-15 DIAGNOSIS — M25.462 KNEE EFFUSION, LEFT: Primary | ICD-10-CM

## 2018-03-15 PROCEDURE — 20610 DRAIN/INJ JOINT/BURSA W/O US: CPT | Performed by: FAMILY MEDICINE

## 2018-03-15 PROCEDURE — 99213 OFFICE O/P EST LOW 20 MIN: CPT | Performed by: FAMILY MEDICINE

## 2018-03-15 RX ORDER — TRIAMCINOLONE ACETONIDE 40 MG/ML
40 INJECTION, SUSPENSION INTRA-ARTICULAR; INTRAMUSCULAR ONCE
Status: COMPLETED | OUTPATIENT
Start: 2018-03-15 | End: 2018-03-15

## 2018-03-15 RX ADMIN — TRIAMCINOLONE ACETONIDE 40 MG: 40 INJECTION, SUSPENSION INTRA-ARTICULAR; INTRAMUSCULAR at 12:23

## 2018-03-15 NOTE — PROGRESS NOTES
Subjective:     Chief Complaint   Patient presents with    Knee Pain     left knee, popping, has gone out twice on him, worried about work, walking with cane on Tuesday, ice         Cookie Bailey is a 60 y.o. male who presents with knee pain involving the left knee. Onset was gradual, starting about a few weeks ago. Inciting event: none known. Current symptoms include: crepitus sensation, giving out, stiffness and swelling. Pain is aggravated by standing and walking. Patient has had no prior knee problems. Evaluation to date: chiropractic. Treatment to date: avoidance of offending activity and bracing/taping. Patient's medications, allergies, past medical, surgical, social and family histories were reviewed and updated as appropriate. Review of Systems  Pertinent items are noted in HPI. Objective:      /78 (Site: Right Arm)   Pulse 79   Ht 5' 11\" (1.803 m)   Wt 286 lb 3.2 oz (129.8 kg)   SpO2 98%   BMI 39.92 kg/m²   Right knee: normal and no effusion, full active range of motion, no joint line tenderness, ligamentous structures intact. Left knee:  effusion, crepitus, medial prominent varicosity         Injection Procedure Note    Pre-operative Diagnosis: left knee DJD with pain and effusion    Post-operative Diagnosis: same    Indications: Symptom relief from osteoarthritis    Anesthesia: ethyl chloride    Procedure Details     Verbal consent was obtained for the procedure. The joint was prepped with Betadine and alcohol and ethyl chloride spray. 2 ml 2% lidocaine and 1 ml of triamcinolone (KENALOG) 40mg/ml was then injected into the joint using supero-lateral approach The needle was removed and the area cleansed and dressed. Complications:  None; patient tolerated the procedure well. Assessment:     1.  Knee effusion, left  20610 - MT DRAIN/INJECT LARGE JOINT/BURSA    triamcinolone acetonide (KENALOG-40) injection 40 mg          Plan:      Natural history and expected course

## 2018-04-24 DIAGNOSIS — I10 ESSENTIAL HYPERTENSION: ICD-10-CM

## 2018-04-25 RX ORDER — LOSARTAN POTASSIUM AND HYDROCHLOROTHIAZIDE 12.5; 5 MG/1; MG/1
1 TABLET ORAL DAILY
Qty: 90 TABLET | Refills: 2 | Status: SHIPPED | OUTPATIENT
Start: 2018-04-25 | End: 2018-12-31 | Stop reason: SDUPTHER

## 2018-05-16 DIAGNOSIS — L30.9 DERMATITIS: ICD-10-CM

## 2018-12-31 DIAGNOSIS — I10 ESSENTIAL HYPERTENSION: ICD-10-CM

## 2019-01-03 RX ORDER — LOSARTAN POTASSIUM AND HYDROCHLOROTHIAZIDE 12.5; 5 MG/1; MG/1
1 TABLET ORAL DAILY
Qty: 90 TABLET | Refills: 2 | Status: SHIPPED | OUTPATIENT
Start: 2019-01-03 | End: 2019-01-04 | Stop reason: SDUPTHER

## 2019-01-04 DIAGNOSIS — I10 ESSENTIAL HYPERTENSION: ICD-10-CM

## 2019-01-04 RX ORDER — LOSARTAN POTASSIUM AND HYDROCHLOROTHIAZIDE 12.5; 5 MG/1; MG/1
1 TABLET ORAL DAILY
Qty: 90 TABLET | Refills: 2 | Status: SHIPPED | OUTPATIENT
Start: 2019-01-04

## 2019-06-28 ENCOUNTER — TELEPHONE (OUTPATIENT)
Dept: GASTROENTEROLOGY | Age: 64
End: 2019-06-28

## 2019-06-28 NOTE — TELEPHONE ENCOUNTER
Patient last seen 02-21-18 for colonoscopy  and will repeat in 1 year for surveillance of several adenomas.     I spoke with patient and he states that he is now seeing PCP at [facility] and he hung up

## 2019-09-04 ENCOUNTER — HOSPITAL ENCOUNTER (OUTPATIENT)
Dept: CARDIAC REHAB | Age: 64
Setting detail: THERAPIES SERIES
Discharge: HOME | End: 2019-09-04

## 2019-09-04 PROCEDURE — 93798 PHYS/QHP OP CAR RHAB W/ECG: CPT
